# Patient Record
Sex: FEMALE | Race: WHITE | NOT HISPANIC OR LATINO | Employment: FULL TIME | URBAN - METROPOLITAN AREA
[De-identification: names, ages, dates, MRNs, and addresses within clinical notes are randomized per-mention and may not be internally consistent; named-entity substitution may affect disease eponyms.]

---

## 2018-01-11 NOTE — MISCELLANEOUS
Provider Comments  Provider Comments:   PT MISSED APPOINTMENT 11/30/2016 WITH DR Wilmar Laboy      Signatures   Electronically signed by : Rocio Ott, ; Nov 30 2016  2:59PM EST                       (Author)

## 2018-07-10 ENCOUNTER — OFFICE VISIT (OUTPATIENT)
Dept: FAMILY MEDICINE CLINIC | Facility: CLINIC | Age: 58
End: 2018-07-10
Payer: COMMERCIAL

## 2018-07-10 VITALS
DIASTOLIC BLOOD PRESSURE: 86 MMHG | WEIGHT: 178 LBS | SYSTOLIC BLOOD PRESSURE: 140 MMHG | BODY MASS INDEX: 32.76 KG/M2 | TEMPERATURE: 98.9 F | HEIGHT: 62 IN

## 2018-07-10 DIAGNOSIS — F32.A ANXIETY AND DEPRESSION: Primary | ICD-10-CM

## 2018-07-10 DIAGNOSIS — F41.9 ANXIETY AND DEPRESSION: Primary | ICD-10-CM

## 2018-07-10 PROCEDURE — 99203 OFFICE O/P NEW LOW 30 MIN: CPT | Performed by: FAMILY MEDICINE

## 2018-07-10 RX ORDER — CELECOXIB 200 MG/1
1 CAPSULE ORAL DAILY
COMMUNITY
Start: 2016-11-17 | End: 2018-08-08

## 2018-07-10 RX ORDER — CITALOPRAM 10 MG/1
10 TABLET ORAL DAILY
Qty: 30 TABLET | Refills: 0 | Status: SHIPPED | OUTPATIENT
Start: 2018-07-10 | End: 2018-08-08

## 2018-07-10 NOTE — LETTER
July 10, 2018     Patient: Dania Daniel   YOB: 1960   Date of Visit: 7/10/2018       To Whom it May Concern:    Dania Daniel is under my professional care  She was seen in my office on 7/10/2018  She may return to work on 7/23/18  If you have any questions or concerns, please don't hesitate to call           Sincerely,          Peyton Perkins MD        CC: No Recipients

## 2018-07-10 NOTE — PROGRESS NOTES
Assessment/Plan:    62-year-old woman with: Anxiety depression  Discussed workup and treatment options at length with risks and benefits  Encouraged healthy diet like the Mediterranean diet, exercise as adjunct to help her mood, ample sleep and stress reduction  Patient also requests some time off work to begin self-care program   Will refer to psychotherapy  Discussed role for medications in will give script for Celexa 10 milligrams daily and patient plans to think about it  Follow-up visit in 1 month  No problem-specific Assessment & Plan notes found for this encounter  Diagnoses and all orders for this visit:    Anxiety and depression  -     Ambulatory referral to Psychology; Future  -     citalopram (CeleXA) 10 mg tablet; Take 1 tablet (10 mg total) by mouth daily    Other orders  -     celecoxib (CeleBREX) 200 mg capsule; Take 1 capsule by mouth daily          Subjective:   Chief Complaint   Patient presents with    Anxiety          Patient ID: David Ventura is a 56 y o  female  Patient is a 62-year-old woman who presents to establish care in this practice  She admits significant issues with her stress level on her anxiety especially as she has had issues with her children getting involved with the law  She finds it difficult to cope at work and difficult to deal with her situation as home and also having difficulty sleeping  No suicidal or homicidal ideation  Patient is never taken medications for her mood but does admit a strong family history of mental health issues  Anxiety             The following portions of the patient's history were reviewed and updated as appropriate: allergies, current medications, past family history, past medical history, past social history, past surgical history and problem list     Review of Systems   Constitutional: Negative  HENT: Negative  Eyes: Negative  Respiratory: Negative  Cardiovascular: Negative  Gastrointestinal: Negative  Endocrine: Negative  Genitourinary: Negative  Musculoskeletal: Negative  Allergic/Immunologic: Negative  Neurological: Negative  Hematological: Negative  Psychiatric/Behavioral: Negative  All other systems reviewed and are negative  Objective:      /86 (BP Location: Right arm, Patient Position: Sitting, Cuff Size: Standard)   Temp 98 9 °F (37 2 °C) (Tympanic)   Ht 5' 2" (1 575 m)   Wt 80 7 kg (178 lb)   Breastfeeding? No   BMI 32 56 kg/m²          Physical Exam   Constitutional: She is oriented to person, place, and time  She appears well-developed and well-nourished  HENT:   Head: Atraumatic  Right Ear: External ear normal    Left Ear: External ear normal    Eyes: Conjunctivae and EOM are normal  Pupils are equal, round, and reactive to light  Neck: Normal range of motion  Pulmonary/Chest: Effort normal and breath sounds normal  No respiratory distress  Musculoskeletal: Normal range of motion  Neurological: She is alert and oriented to person, place, and time  No cranial nerve deficit  Skin: Skin is warm and dry     Psychiatric: Her behavior is normal  Judgment and thought content normal    Anxious and tearful mood and affect

## 2018-07-20 ENCOUNTER — TELEPHONE (OUTPATIENT)
Dept: FAMILY MEDICINE CLINIC | Facility: CLINIC | Age: 58
End: 2018-07-20

## 2018-07-20 NOTE — LETTER
July 20, 2018     Patient: Cammy Goldsmith   YOB: 1960   Date of Visit: 7/20/2018       To Whom it May Concern:    Cammy Goldsmith is under my professional care  She was seen in my office on 7/20/2018  She may return to work on 7/25/2018  If you have any questions or concerns, please don't hesitate to call           Sincerely,          Amber Lozoya MA        CC: No Recipients

## 2018-07-23 ENCOUNTER — TELEPHONE (OUTPATIENT)
Dept: FAMILY MEDICINE CLINIC | Facility: CLINIC | Age: 58
End: 2018-07-23

## 2018-07-24 NOTE — TELEPHONE ENCOUNTER
I left a message for the patient to return my call 
Note for patient to be out of work Monday and Tuesday and return to work Wednesday    Patient can discuss further with Dr Gabe Gamble when he returns
Note is made and faxed over to employer
Okay with me
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
PT CALLED  SHE GOT THE NOTE TO RETURN TO WORK TOMORROW, Wednesday BUT SHE DOESN'T FEEL LIKE SHE IS READY  SHE WANTS TO KNOW IF YOU COULD EXTEND IT OUT TO RETURN TO WORK 8/6/18? PLEASE FAX TO EMPLOYER AGAIN TO THE NUMBER BELOW  LET PT KNOW WHEN DONE 
1 pair

## 2018-08-08 ENCOUNTER — OFFICE VISIT (OUTPATIENT)
Dept: FAMILY MEDICINE CLINIC | Facility: CLINIC | Age: 58
End: 2018-08-08
Payer: COMMERCIAL

## 2018-08-08 VITALS
WEIGHT: 185 LBS | BODY MASS INDEX: 34.04 KG/M2 | SYSTOLIC BLOOD PRESSURE: 130 MMHG | HEIGHT: 62 IN | DIASTOLIC BLOOD PRESSURE: 90 MMHG

## 2018-08-08 DIAGNOSIS — Z12.11 SCREENING FOR COLON CANCER: ICD-10-CM

## 2018-08-08 DIAGNOSIS — F41.9 ANXIETY AND DEPRESSION: Primary | ICD-10-CM

## 2018-08-08 DIAGNOSIS — F32.A ANXIETY AND DEPRESSION: Primary | ICD-10-CM

## 2018-08-08 PROCEDURE — 99213 OFFICE O/P EST LOW 20 MIN: CPT | Performed by: FAMILY MEDICINE

## 2018-08-08 PROCEDURE — 3008F BODY MASS INDEX DOCD: CPT | Performed by: FAMILY MEDICINE

## 2018-08-09 NOTE — PROGRESS NOTES
Assessment/Plan:    62-year-old woman with: Anxiety depression  Discussed workup and treatment options with risks and benefits and discuss that if she continues to improve that she can follow-up as needed  Of other issues develop she can give us a call  No problem-specific Assessment & Plan notes found for this encounter  Diagnoses and all orders for this visit:    Anxiety and depression    Screening for colon cancer  -     Ambulatory referral to Gastroenterology; Future  -     Occult Bloood,Fecal Immunochemical; Future          Subjective:   Chief Complaint   Patient presents with    Follow-up     1 month follow up on anxiety and depression pt states she is feeling much better  No concerns  Patient ID: Ivone Gutierrez is a 56 y o  female  Patient is a 62-year-old woman who presents for follow-up on anxiety and depression  Patient admits she has been doing self-care with plenty of rest, some time off work and stress reduction and she feels significantly better, over 70%  Patient denies any new side effects and did not end up trying the medicine could she felt that she did not needed  Patient is back to work and feeling good  No other complaints at this point  The following portions of the patient's history were reviewed and updated as appropriate: allergies, current medications, past family history, past medical history, past social history, past surgical history and problem list     Review of Systems   Constitutional: Negative  HENT: Negative  Eyes: Negative  Respiratory: Negative  Cardiovascular: Negative  Gastrointestinal: Negative  Endocrine: Negative  Genitourinary: Negative  Musculoskeletal: Negative  Allergic/Immunologic: Negative  Neurological: Negative  Hematological: Negative  Psychiatric/Behavioral: Negative  All other systems reviewed and are negative          Objective:      /90 (BP Location: Right arm, Patient Position: Sitting, Cuff Size: Standard)   Ht 5' 2" (1 575 m)   Wt 83 9 kg (185 lb)   BMI 33 84 kg/m²          Physical Exam   Constitutional: She is oriented to person, place, and time  She appears well-developed and well-nourished  HENT:   Head: Atraumatic  Right Ear: External ear normal    Left Ear: External ear normal    Eyes: Conjunctivae and EOM are normal  Pupils are equal, round, and reactive to light  Neck: Normal range of motion  Pulmonary/Chest: Effort normal  No respiratory distress  Musculoskeletal: Normal range of motion  Neurological: She is alert and oriented to person, place, and time  No cranial nerve deficit  Skin: Skin is warm and dry  Psychiatric: She has a normal mood and affect   Her behavior is normal  Judgment and thought content normal

## 2019-01-29 DIAGNOSIS — Z12.11 SCREENING FOR COLON CANCER: Primary | ICD-10-CM

## 2020-04-16 ENCOUNTER — TELEPHONE (OUTPATIENT)
Dept: ADMINISTRATIVE | Facility: OTHER | Age: 60
End: 2020-04-16

## 2021-06-25 ENCOUNTER — OFFICE VISIT (OUTPATIENT)
Dept: FAMILY MEDICINE CLINIC | Facility: CLINIC | Age: 61
End: 2021-06-25
Payer: COMMERCIAL

## 2021-06-25 VITALS
SYSTOLIC BLOOD PRESSURE: 140 MMHG | BODY MASS INDEX: 30.2 KG/M2 | HEIGHT: 67 IN | DIASTOLIC BLOOD PRESSURE: 90 MMHG | WEIGHT: 192.4 LBS

## 2021-06-25 DIAGNOSIS — R20.2 NUMBNESS AND TINGLING OF RIGHT THUMB: ICD-10-CM

## 2021-06-25 DIAGNOSIS — Z12.11 SCREENING FOR COLON CANCER: Primary | ICD-10-CM

## 2021-06-25 DIAGNOSIS — S49.91XA INJURY OF RIGHT SHOULDER, INITIAL ENCOUNTER: ICD-10-CM

## 2021-06-25 DIAGNOSIS — R20.0 NUMBNESS AND TINGLING OF RIGHT THUMB: ICD-10-CM

## 2021-06-25 PROCEDURE — 99213 OFFICE O/P EST LOW 20 MIN: CPT | Performed by: FAMILY MEDICINE

## 2021-06-25 PROCEDURE — 3725F SCREEN DEPRESSION PERFORMED: CPT | Performed by: FAMILY MEDICINE

## 2021-06-25 RX ORDER — KETOROLAC TROMETHAMINE 30 MG/ML
30 INJECTION, SOLUTION INTRAMUSCULAR; INTRAVENOUS ONCE
Status: COMPLETED | OUTPATIENT
Start: 2021-06-25 | End: 2021-06-25

## 2021-06-25 RX ORDER — METHOCARBAMOL 500 MG/1
500 TABLET, FILM COATED ORAL 3 TIMES DAILY PRN
Qty: 90 TABLET | Refills: 1 | Status: SHIPPED | OUTPATIENT
Start: 2021-06-25

## 2021-06-25 RX ADMIN — KETOROLAC TROMETHAMINE 30 MG: 30 INJECTION, SOLUTION INTRAMUSCULAR; INTRAVENOUS at 14:38

## 2021-06-25 NOTE — LETTER
June 25, 2021     Patient: Mak Elaine   YOB: 1960   Date of Visit: 6/25/2021       To Whom it May Concern:    Mak Elaine is under my professional care  She was seen in my office on 6/25/2021  She may not return to work until re-evaluated by physician in 2 weeks  If you have any questions or concerns, please don't hesitate to call           Sincerely,          Silver Perdue MD        CC: No Recipients

## 2021-06-28 NOTE — PROGRESS NOTES
Assessment/Plan:    72-year-old woman with:  Right shoulder pain and numbness and tingling in his arms bilaterally discussed heat and cold stretching anti-inflammatories will refer to physical therapy and give muscle relaxer for breakthrough since will give IM Toradol discussed supportive care return parameters advised to call back if not improving or worsen    No problem-specific Assessment & Plan notes found for this encounter  Diagnoses and all orders for this visit:    Screening for colon cancer  -     Ambulatory referral for colonoscopy; Future  -     Ambulatory referral to Physical Therapy; Future  -     methocarbamol (ROBAXIN) 500 mg tablet; Take 1 tablet (500 mg total) by mouth 3 (three) times a day as needed for muscle spasms    Injury of right shoulder, initial encounter  -     ketorolac (TORADOL) 60 mg/2 mL IM injection 30 mg  -     Ambulatory referral to Physical Therapy; Future  -     methocarbamol (ROBAXIN) 500 mg tablet; Take 1 tablet (500 mg total) by mouth 3 (three) times a day as needed for muscle spasms    Numbness and tingling of right thumb  -     Ambulatory referral to Physical Therapy; Future  -     methocarbamol (ROBAXIN) 500 mg tablet; Take 1 tablet (500 mg total) by mouth 3 (three) times a day as needed for muscle spasms          Subjective:     Chief Complaint   Patient presents with    Pain     rt shoulder and arm pain     Physical Exam        Patient ID: Antione Myers is a 64 y o  female  Patient is a 72-year-old woman presents for follow-up on right shoulder pain along with numbness and tingling some and her arms no fevers chills nausea vomiting      The following portions of the patient's history were reviewed and updated as appropriate: allergies, current medications, past family history, past medical history, past social history, past surgical history and problem list     Review of Systems   Constitutional: Negative  HENT: Negative  Eyes: Negative      Respiratory: Negative  Cardiovascular: Negative  Gastrointestinal: Negative  Endocrine: Negative  Genitourinary: Negative  Musculoskeletal: Negative  Allergic/Immunologic: Negative  Neurological: Negative  Hematological: Negative  Psychiatric/Behavioral: Negative  All other systems reviewed and are negative  Objective:      /90 (BP Location: Left arm, Patient Position: Sitting, Cuff Size: Standard)   Ht 5' 7" (1 702 m)   Wt 87 3 kg (192 lb 6 4 oz)   BMI 30 13 kg/m²          Physical Exam  Constitutional:       Appearance: She is well-developed  HENT:      Head: Atraumatic  Right Ear: External ear normal       Left Ear: External ear normal    Eyes:      Conjunctiva/sclera: Conjunctivae normal       Pupils: Pupils are equal, round, and reactive to light  Cardiovascular:      Rate and Rhythm: Normal rate and regular rhythm  Heart sounds: Normal heart sounds  Pulmonary:      Effort: Pulmonary effort is normal  No respiratory distress  Breath sounds: Normal breath sounds  Abdominal:      General: Bowel sounds are normal  There is no distension  Palpations: Abdomen is soft  Tenderness: There is no abdominal tenderness  There is no guarding or rebound  Musculoskeletal:         General: Normal range of motion  Cervical back: Normal range of motion  Skin:     General: Skin is warm and dry  Neurological:      Mental Status: She is alert and oriented to person, place, and time  Cranial Nerves: No cranial nerve deficit  Psychiatric:         Behavior: Behavior normal          Thought Content: Thought content normal          Judgment: Judgment normal          BMI Counseling: Body mass index is 30 13 kg/m²  The BMI is above normal  Nutrition recommendations include encouraging healthy choices of fruits and vegetables  Exercise recommendations include moderate physical activity 150 minutes/week

## 2021-07-09 ENCOUNTER — DOCUMENTATION (OUTPATIENT)
Dept: FAMILY MEDICINE CLINIC | Facility: CLINIC | Age: 61
End: 2021-07-09

## 2021-07-09 ENCOUNTER — OFFICE VISIT (OUTPATIENT)
Dept: FAMILY MEDICINE CLINIC | Facility: CLINIC | Age: 61
End: 2021-07-09
Payer: COMMERCIAL

## 2021-07-09 VITALS
OXYGEN SATURATION: 96 % | BODY MASS INDEX: 35.77 KG/M2 | HEIGHT: 62 IN | WEIGHT: 194.4 LBS | SYSTOLIC BLOOD PRESSURE: 126 MMHG | DIASTOLIC BLOOD PRESSURE: 86 MMHG | TEMPERATURE: 98.4 F | HEART RATE: 97 BPM

## 2021-07-09 DIAGNOSIS — R20.0 NUMBNESS AND TINGLING OF RIGHT THUMB: ICD-10-CM

## 2021-07-09 DIAGNOSIS — Z12.31 ENCOUNTER FOR SCREENING MAMMOGRAM FOR BREAST CANCER: Primary | ICD-10-CM

## 2021-07-09 DIAGNOSIS — M19.90 ARTHRITIS: ICD-10-CM

## 2021-07-09 DIAGNOSIS — R20.2 NUMBNESS AND TINGLING OF RIGHT THUMB: ICD-10-CM

## 2021-07-09 PROCEDURE — 3008F BODY MASS INDEX DOCD: CPT | Performed by: FAMILY MEDICINE

## 2021-07-09 PROCEDURE — 99213 OFFICE O/P EST LOW 20 MIN: CPT | Performed by: FAMILY MEDICINE

## 2021-07-11 NOTE — PROGRESS NOTES
Assessment/Plan:    80-year-old woman with: Neck right shoulder pain and numbness and tingling and right arm  Discussed heat and cold stretching anti-inflammatories refer to PT and advised if not improving worsening to call back    No problem-specific Assessment & Plan notes found for this encounter  Diagnoses and all orders for this visit:    Encounter for screening mammogram for breast cancer  -     Mammo screening bilateral w 3d & cad; Future    Arthritis    Numbness and tingling of right thumb          Subjective:     Chief Complaint   Patient presents with    Follow-up     2 week, discuss medication        Patient ID: Bhavana Demarco is a 64 y o  female  Patient is a 80-year-old woman who presents for follow-up on arthritis and neck shoulder pain and right arm pain with numbness and tingling no fevers chills nausea vomiting      The following portions of the patient's history were reviewed and updated as appropriate: allergies, current medications, past family history, past medical history, past social history, past surgical history and problem list     Review of Systems   Constitutional: Negative  HENT: Negative  Eyes: Negative  Respiratory: Negative  Cardiovascular: Negative  Gastrointestinal: Negative  Endocrine: Negative  Genitourinary: Negative  Musculoskeletal: Positive for arthralgias, myalgias and neck pain  Allergic/Immunologic: Negative  Neurological: Positive for numbness  Hematological: Negative  Psychiatric/Behavioral: Negative  All other systems reviewed and are negative  Objective:      /86   Pulse 97   Temp 98 4 °F (36 9 °C)   Ht 5' 2" (1 575 m)   Wt 88 2 kg (194 lb 6 4 oz)   SpO2 96%   BMI 35 56 kg/m²          Physical Exam  Constitutional:       Appearance: She is well-developed  HENT:      Head: Atraumatic        Right Ear: External ear normal       Left Ear: External ear normal    Eyes:      Conjunctiva/sclera: Conjunctivae normal       Pupils: Pupils are equal, round, and reactive to light  Pulmonary:      Effort: Pulmonary effort is normal  No respiratory distress  Abdominal:      General: There is no distension  Musculoskeletal:         General: Normal range of motion  Cervical back: Normal range of motion  Skin:     General: Skin is warm and dry  Neurological:      Mental Status: She is alert and oriented to person, place, and time  Cranial Nerves: No cranial nerve deficit  Psychiatric:         Behavior: Behavior normal          Thought Content:  Thought content normal          Judgment: Judgment normal

## 2021-07-12 ENCOUNTER — EVALUATION (OUTPATIENT)
Dept: PHYSICAL THERAPY | Facility: CLINIC | Age: 61
End: 2021-07-12
Payer: COMMERCIAL

## 2021-07-12 DIAGNOSIS — Z12.11 SCREENING FOR COLON CANCER: ICD-10-CM

## 2021-07-12 DIAGNOSIS — R20.0 NUMBNESS AND TINGLING OF RIGHT THUMB: ICD-10-CM

## 2021-07-12 DIAGNOSIS — R20.0 NUMBNESS OF RIGHT THUMB: ICD-10-CM

## 2021-07-12 DIAGNOSIS — R20.2 NUMBNESS AND TINGLING OF RIGHT THUMB: ICD-10-CM

## 2021-07-12 DIAGNOSIS — S49.91XA INJURY OF RIGHT SHOULDER, INITIAL ENCOUNTER: Primary | ICD-10-CM

## 2021-07-12 PROCEDURE — 97161 PT EVAL LOW COMPLEX 20 MIN: CPT | Performed by: PHYSICAL THERAPIST

## 2021-07-12 PROCEDURE — 97110 THERAPEUTIC EXERCISES: CPT | Performed by: PHYSICAL THERAPIST

## 2021-07-12 NOTE — PROGRESS NOTES
PT Evaluation     Today's date: 2021  Patient name: Adelfo Bella  : 1960  MRN: [id number]  Referring provider: Wanda Strickland DO  Dx:   Encounter Diagnosis     ICD-10-CM    1  Injury of right shoulder, initial encounter  S49  91XA Ambulatory referral to Physical Therapy   2  Numbness of right thumb  R20 0    3  Screening for colon cancer  Z12 11 Ambulatory referral to Physical Therapy   4  Numbness and tingling of right thumb  R20 0 Ambulatory referral to Physical Therapy    R20 2        Start Time: 808          Assessment  Assessment details: Adelfo Bella is a pleasant 64 y o  female who presents with signs and symptoms correlating with referring diagnosis  No further referral appears necessary at this time based upon examination results  The patient's greatest concerns are decreasing pain to return to work  She presents with a movement impairment diagnosis of hypomobile cervical extension ROM  She demonstrates an extension based derangement of her cervical spine with centralization during retractions to her arm  This also presents with ER shoulder and extension of thumb strength deficits, hyposensitivity to the R arm in C6 pattern, and poor ipsilateral rotation and lateral flexion of cervical spine  Negative prognostic indicators: none  Positive prognostic indicators: good motivation  Please contact me if you have any further questions or recommendations  Thank you very much for the kind referral       Impairments: abnormal or restricted ROM, abnormal movement, activity intolerance, impaired physical strength and pain with function    Symptom irritability: moderateUnderstanding of Dx/Px/POC: good   Prognosis: good    Goals  STGs  1  Decrease pain by 20% in 2-4 weeks  2  Improve cervical ROM by 25% in 2-4 weeks  3  Improve shoulder ER and thumb ext strength by 1/3 grade in 2-4 weeks  4  Let arm hang at side without pain in 4 weeks  5  Complete HEP with (I) in 4 weeks  LTGs  1   Decrease pain by 60% in 6-8 weeks  2  Improve lifting tolerance to >20 #s in 6-8 weeks  3  Perform job activities without pain in 6-8 weeks  Plan  Plan details: 1-2x/week depending on need  Patient would benefit from: skilled physical therapy  Referral necessary: No  Planned modality interventions: cryotherapy, TENS and thermotherapy: hydrocollator packs  Planned therapy interventions: manual therapy, therapeutic training, stretching, strengthening, therapeutic activities, therapeutic exercise, patient education, activity modification, neuromuscular re-education and home exercise program  Frequency: 2x week  Duration in weeks: 6  Treatment plan discussed with: patient        Subjective Evaluation    History of Present Illness  Mechanism of injury: Pt is a 64 y o  female presenting w/ radicular cervical neck pain, starting 3 weeks ago  She states this started insidiously and presents in a radicular pattern from her scapular region to the R hand  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, and lift furniture  However, states she can tolerate moving the arm above head and finds comfort with this, although, returning arm to neutral unsupported increases pain  She is stating the pain has been improving on its own  Pain is worse when arm is hanging at her side, states pain epicenter is on the R side of CTJ       Neurological signs: numbness of R palm thumb in radicular pattern   Red Flags: none   PMH: none           Not a recurrent problem   Quality of life: good    Pain  Current pain ratin  At best pain ratin  At worst pain ratin  Location: R rhomboid and paraspinals   Quality: dull ache  Relieving factors: change in position  Exacerbated by: driving a car   Progression: improved    Social Support  Lives with: spouse    Employment status: working  Hand dominance: right    Patient Goals  Patient goals for therapy: increased strength, decreased pain, increased motion, independence with ADLs/IADLs and return to work          Objective     Neurological Testing     Sensation   Cervical/Thoracic   Left   Intact: light touch    Right   Hyposensation: light touch    Comments   Right light touch: C6 dermatome       Active Range of Motion   Cervical/Thoracic Spine       Cervical  Subcranial protraction:  WFL   Subcranial retraction:  with pain   Restriction level: maximal  Flexion:  WFL  Extension:  with pain Restriction level: maximal  Left lateral flexion:  WFL  Right lateral flexion:  with pain Restriction level minimal  Left rotation:  WFL  Right rotation:  with pain Restriction level: minimal  Left Shoulder   Normal active range of motion  Internal rotation BTB: T9     Right Shoulder   Normal active range of motion  Internal rotation BTB: sacrum   Mechanical Assessment    Cervical    Seated retraction: repeated movements   Pain location: no change  Pain intensity: better  Pain level: decreased  unable to achieve end range, in supine or sitting    Thoracic      Lumbar      Strength/Myotome Testing     Left Shoulder     Planes of Motion   Flexion: 4+   Extension: 4+   Abduction: 4+   Adduction: 4+   External rotation at 0°: 3+   Internal rotation at 0°: 4+     Isolated Muscles   Biceps: 4+   Triceps: 4+     Right Shoulder     Planes of Motion   Flexion: 4+   Extension: 4+   Abduction: 4+   Adduction: 4+   External rotation at 0°: 4+   Internal rotation at 0°: 4+     Isolated Muscles   Biceps: 4+   Triceps: 4+     Additional Strength Details  B wrist WNL, thumb ext 4-/5 LUE and 3/5 RUE                Precautions: none    Daily Treatment Diary    Date 7/12       Visit Number 1       FOTO IE       Re-Eval IE          Manuals    Retraction with OP         Cervical retraction,distraciton, extension                        Neuro Re-Ed     xochitl row         xochitl ext         Radial nerve glides        No monies                                 Ther Ex    Cervical retraction in sitting  HEP       Retraction in sitting with OP  HEP        Retraction with ext in sitting         UBE BW         Scapular slides         Thoracic ext with OP for upper thoracic                         Ther Activity                                    Modalities    MHP PRN

## 2021-07-16 ENCOUNTER — OFFICE VISIT (OUTPATIENT)
Dept: PHYSICAL THERAPY | Facility: CLINIC | Age: 61
End: 2021-07-16
Payer: COMMERCIAL

## 2021-07-16 DIAGNOSIS — Z12.11 SCREENING FOR COLON CANCER: ICD-10-CM

## 2021-07-16 DIAGNOSIS — R20.2 NUMBNESS AND TINGLING OF RIGHT THUMB: ICD-10-CM

## 2021-07-16 DIAGNOSIS — R20.0 NUMBNESS OF RIGHT THUMB: ICD-10-CM

## 2021-07-16 DIAGNOSIS — S49.91XA INJURY OF RIGHT SHOULDER, INITIAL ENCOUNTER: Primary | ICD-10-CM

## 2021-07-16 DIAGNOSIS — R20.0 NUMBNESS AND TINGLING OF RIGHT THUMB: ICD-10-CM

## 2021-07-16 PROCEDURE — 97112 NEUROMUSCULAR REEDUCATION: CPT | Performed by: PHYSICAL THERAPIST

## 2021-07-16 PROCEDURE — 97110 THERAPEUTIC EXERCISES: CPT | Performed by: PHYSICAL THERAPIST

## 2021-07-16 NOTE — PROGRESS NOTES
Daily Note     Today's date: 2021  Patient name: Stevie Ruiz  : 1960  MRN: [id number]  Referring provider: Masoud Hodgson DO  Dx:   Encounter Diagnosis     ICD-10-CM    1  Injury of right shoulder, initial encounter  S49  91XA    2  Numbness of right thumb  R20 0    3  Screening for colon cancer  Z12 11    4  Numbness and tingling of right thumb  R20 0     R20 2                   Subjective: Pt reports that she has noticed slight improvement in overall tolerance to ROM and decreased numbness in the finger in the AM when she wakes up  She is currently doing well with interventions, but is still progressing  Objective: See treatment diary below      Assessment: Tolerated treatment well  Patient did have increased discomfort with some interventions today, but was able to complete all of them without issues  She will continue to progress next visit as tolerated, and was educated about her current condition and that is easily modified with interventions and repetitive loading  Plan: Continue per plan of care        Precautions: none    Daily Treatment Diary    Date       Visit Number 1 2      FOTO IE       Re-Eval IE          Manuals    Retraction with OP         Cervical retraction,distraciton, extension                        Neuro Re-Ed     xochitl row   14 5R 2x15      xochitl ext   11R 2x15      Radial nerve glides  10x w/ cues      No monies   10x5" GTB       Wall walks         Body blade        Ball on wall         Ther Ex    Cervical retraction in sitting  HEP 10x      Retraction in sitting with OP  HEP  10x       Retraction with ext in sitting   hold      UBE BW   5'      Scapular slides   10x2"        Thoracic ext with OP for upper thoracic   10x       Prone y/t                 Ther Activity                                    Modalities    MHP PRN

## 2021-07-20 ENCOUNTER — OFFICE VISIT (OUTPATIENT)
Dept: PHYSICAL THERAPY | Facility: CLINIC | Age: 61
End: 2021-07-20
Payer: COMMERCIAL

## 2021-07-20 DIAGNOSIS — S49.91XA INJURY OF RIGHT SHOULDER, INITIAL ENCOUNTER: Primary | ICD-10-CM

## 2021-07-20 DIAGNOSIS — R20.0 NUMBNESS OF RIGHT THUMB: ICD-10-CM

## 2021-07-20 PROCEDURE — 97110 THERAPEUTIC EXERCISES: CPT

## 2021-07-20 PROCEDURE — 97112 NEUROMUSCULAR REEDUCATION: CPT

## 2021-07-20 NOTE — PROGRESS NOTES
Daily Note     Today's date: 2021  Patient name: Rikki Cardenas  : 1960  MRN: [id number]  Referring provider: Brenna Nuñez DO  Dx:   Encounter Diagnosis     ICD-10-CM    1  Injury of right shoulder, initial encounter  S49  91XA    2  Numbness of right thumb  R20 0                   Subjective: Pt presents to PT reporting mild decrease in sx radiating down R UE  Pt denies increased pain or sx post PT session  Pt reports increased sx with cervical retractions but states the sx disipate quickly  Objective: See treatment diary below      Assessment: Tolerated treatment well but challenged with B shoulder over head secondary to fatigue  Attempted STM to R UT however pt reporting burning with STM  Performed SOR and gentle distraction with + response  Patient demonstrated fatigue post treatment, exhibited good technique with therapeutic exercises and would benefit from continued PT to increase flexibility, strength and function  Plan: Continue per plan of care        Precautions: none    Daily Treatment Diary    Date      Visit Number 1 2 3     FOTO IE       Re-Eval IE          Manuals    Retraction with OP         Cervical retraction,distraciton, extension        STM   PK     SOR & distraction   PK     Neuro Re-Ed     xochitl row   14 5R 2x15 15R 2x15     xochitl ext   11R 2x15 11 5R 2x15     Radial nerve glides  10x w/ cues 10x w/ cues     No monies   10x5" GTB  10x5" GTB     Wall walks         Body blade        Ball on wall         Ther Ex    Cervical retraction in sitting  HEP 10x 10x     Retraction in sitting with OP  HEP  10x  10x     Retraction with ext in sitting   hold      UBE BW   5' 5'     Scapular slides   10x2"   5" x 10     Thoracic ext with OP for upper thoracic   10x  10x     Prone y/t                 Ther Activity                                    Modalities    MHP PRN

## 2021-07-27 ENCOUNTER — OFFICE VISIT (OUTPATIENT)
Dept: PHYSICAL THERAPY | Facility: CLINIC | Age: 61
End: 2021-07-27
Payer: COMMERCIAL

## 2021-07-27 DIAGNOSIS — R20.0 NUMBNESS OF RIGHT THUMB: ICD-10-CM

## 2021-07-27 DIAGNOSIS — S49.91XA INJURY OF RIGHT SHOULDER, INITIAL ENCOUNTER: Primary | ICD-10-CM

## 2021-07-27 PROCEDURE — 97140 MANUAL THERAPY 1/> REGIONS: CPT

## 2021-07-27 PROCEDURE — 97112 NEUROMUSCULAR REEDUCATION: CPT

## 2021-07-27 PROCEDURE — 97535 SELF CARE MNGMENT TRAINING: CPT

## 2021-07-27 PROCEDURE — 97110 THERAPEUTIC EXERCISES: CPT

## 2021-07-27 NOTE — PROGRESS NOTES
Daily Note     Today's date: 2021  Patient name: Joan Da Silva  : 1960  MRN: [id number]  Referring provider: Neena Ramey DO  Dx:   Encounter Diagnosis     ICD-10-CM    1  Injury of right shoulder, initial encounter  S49  91XA    2  Numbness of right thumb  R20 0                   Subjective: Pt presents to PT reporting temporary relief in R UT after last session but states only lasted 2 days  She reports R UE sx increase with activity such as carrying grocery items  Objective: See treatment diary below      Assessment: Pt demonstrates fair tolerance to manual therapy pt noting increased sx of achy R UE   STM Dc'd secondary to irritation noted in UT and posterior R shoulder  Issued pt an updated HEP with green and blue band  Instructed pt on proper use of bands and reviewed TE  Patient demonstrated fatigue post treatment, exhibited good technique with therapeutic exercises and would benefit from continued PT to increase flexibility, strength and function  Plan: Continue per plan of care        Precautions: none    Daily Treatment Diary    Date     Visit Number 1 2 3 4    FOTO IE       Re-Eval IE          Manuals    Retraction with OP         Cervical retraction,distraciton, extension    HY    STM   PK PK    SOR & distraction   PK PK    Neuro Re-Ed     xochitl row   14 5R 2x15 15R 2x15 15R 2x15    xochitl ext   11R 2x15 11 5R 2x15 12R 2x15    Radial nerve glides  10x w/ cues 10x w/ cues 10x w/ cues    No monies   10x5" GTB  10x5" GTB 10x5" GTB    Wall walks         Body blade        Ball on wall         Ther Ex    Cervical retraction in sitting  HEP 10x 10x 10x    Retraction in sitting with OP  HEP  10x  10x 10x    Retraction with ext in sitting   hold      UBE BW   5' 5' 5'    Scapular slides   10x2"   5" x 10 5" x 10    Thoracic ext with OP for upper thoracic   10x  10x 10x    Prone y/t                 Ther Activity                                    Modalities MHP PRN

## 2021-07-30 ENCOUNTER — RA CDI HCC (OUTPATIENT)
Dept: OTHER | Facility: HOSPITAL | Age: 61
End: 2021-07-30

## 2021-07-30 NOTE — PROGRESS NOTES
[facility] 77  coding opportunities          Chart reviewed, no opportunity found: CHART REVIEWED, NO OPPORTUNITY FOUND                     Patients insurance company: [employer] (Medicare Advantage and Commercial)

## 2021-08-02 ENCOUNTER — APPOINTMENT (OUTPATIENT)
Dept: PHYSICAL THERAPY | Facility: CLINIC | Age: 61
End: 2021-08-02
Payer: COMMERCIAL

## 2021-08-04 ENCOUNTER — OFFICE VISIT (OUTPATIENT)
Dept: PHYSICAL THERAPY | Facility: CLINIC | Age: 61
End: 2021-08-04
Payer: COMMERCIAL

## 2021-08-04 DIAGNOSIS — R20.0 NUMBNESS OF RIGHT THUMB: ICD-10-CM

## 2021-08-04 DIAGNOSIS — S49.91XA INJURY OF RIGHT SHOULDER, INITIAL ENCOUNTER: Primary | ICD-10-CM

## 2021-08-04 DIAGNOSIS — R20.2 NUMBNESS AND TINGLING OF RIGHT THUMB: ICD-10-CM

## 2021-08-04 DIAGNOSIS — R20.0 NUMBNESS AND TINGLING OF RIGHT THUMB: ICD-10-CM

## 2021-08-04 PROCEDURE — 97140 MANUAL THERAPY 1/> REGIONS: CPT

## 2021-08-04 PROCEDURE — 97110 THERAPEUTIC EXERCISES: CPT

## 2021-08-04 PROCEDURE — 97112 NEUROMUSCULAR REEDUCATION: CPT

## 2021-08-04 NOTE — PROGRESS NOTES
Daily Note     Today's date: 2021  Patient name: Bhavana Demarco  : 1960  MRN: [id number]  Referring provider: Jacqueline Johnson DO  Dx:   Encounter Diagnosis     ICD-10-CM    1  Injury of right shoulder, initial encounter  S49  91XA    2  Numbness of right thumb  R20 0    3  Numbness and tingling of right thumb  R20 0     R20 2                   Subjective: Pt presents to PT reporting tightness in posterior R shoulder remains but states R UE is feeling better noting less numbness and tingling  Objective: See treatment diary below      Assessment: Pt demonstrates increased sx down R UE with STM along levator from low CS to posterior shoulder an unable to tolerate deep STM  Added side glide as noted above with positive response  Pt advised to perform stretches to increased flexibility in R UT  Patient demonstrated fatigue post treatment, exhibited good technique with therapeutic exercises and would benefit from continued PT to decrease sx in UE and sensitivity in posterior shoulder  Plan: Continue per plan of care        Precautions: none    Daily Treatment Diary    Date  8/   Visit Number 1 2 3 4 5   FOTO IE    50/57   Re-Eval IE          Manuals    Retraction with OP         Cervical retraction,distraciton, extension    HY HY, also side glide to L c median nerve glide R UE   STM   PK PK PK   SOR & distraction   PK PK    Neuro Re-Ed     xochitl row   14 5R 2x15 15R 2x15 15R 2x15 15 5R 2x10   xochitl ext   11R 2x15 11 5R 2x15 12R 2x15 12 5R 2x10   Radial nerve glides  10x w/ cues 10x w/ cues 10x w/ cues 10x w/ cues   No monies   10x5" GTB  10x5" GTB 10x5" GTB 10x5" GTB   Wall walks         Body blade        Ball on wall         Ther Ex    Cervical retraction in sitting  HEP 10x 10x 10x 10x   Retraction in sitting with OP  HEP  10x  10x 10x 10x   Retraction with ext in sitting   hold      UBE BW   5' 5' 5' nv   Scapular slides   10x2"   5" x 10 5" x 10 5" x 10   Thoracic ext with OP for upper thoracic   10x  10x 10x 10x   Prone y/t      10x   UT & Lev to R only     20" x 5   Ther Activity                                    Modalities    MHP PRN

## 2021-08-06 ENCOUNTER — OFFICE VISIT (OUTPATIENT)
Dept: FAMILY MEDICINE CLINIC | Facility: CLINIC | Age: 61
End: 2021-08-06
Payer: COMMERCIAL

## 2021-08-06 VITALS
WEIGHT: 194 LBS | HEIGHT: 62 IN | DIASTOLIC BLOOD PRESSURE: 80 MMHG | BODY MASS INDEX: 35.7 KG/M2 | SYSTOLIC BLOOD PRESSURE: 120 MMHG

## 2021-08-06 DIAGNOSIS — Z00.00 ROUTINE ADULT HEALTH MAINTENANCE: Primary | ICD-10-CM

## 2021-08-06 DIAGNOSIS — Z72.0 TOBACCO ABUSE: ICD-10-CM

## 2021-08-06 PROCEDURE — 3008F BODY MASS INDEX DOCD: CPT | Performed by: FAMILY MEDICINE

## 2021-08-06 PROCEDURE — 99213 OFFICE O/P EST LOW 20 MIN: CPT | Performed by: FAMILY MEDICINE

## 2021-08-06 PROCEDURE — 1036F TOBACCO NON-USER: CPT | Performed by: FAMILY MEDICINE

## 2021-08-06 PROCEDURE — 99396 PREV VISIT EST AGE 40-64: CPT | Performed by: FAMILY MEDICINE

## 2021-08-06 RX ORDER — NICOTINE 21 MG/24HR
1 PATCH, TRANSDERMAL 24 HOURS TRANSDERMAL EVERY 24 HOURS
Qty: 28 PATCH | Refills: 0 | Status: SHIPPED | OUTPATIENT
Start: 2021-08-06

## 2021-08-06 NOTE — LETTER
August 6, 2021     Patient: Dania Daniel   YOB: 1960   Date of Visit: 8/6/2021       To Whom it May Concern:    Dania Daniel is under my professional care  She was seen in my office on 8/6/2021  She may not return to work until re-evaluated by physician in 4 weeks  If you have any questions or concerns, please don't hesitate to call           Sincerely,          Peyton Perkins MD        CC: No Recipients

## 2021-08-09 NOTE — PROGRESS NOTES
Assessment/Plan:    68-year-old woman with: Annual well visit is a safety and health maintenance issues including healthy diet Mediterranean diet exercise healthy weight as tolerated ample sleep stress reduction strategies will check labs    No problem-specific Assessment & Plan notes found for this encounter  Diagnoses and all orders for this visit:    Routine adult health maintenance  -     CBC and differential; Future  -     Comprehensive metabolic panel; Future  -     TSH, 3rd generation with Free T4 reflex; Future  -     Lipid Panel with Direct LDL reflex; Future    Tobacco abuse  -     nicotine (NICODERM CQ) 14 mg/24hr TD 24 hr patch; Place 1 patch on the skin every 24 hours          Subjective:     Chief Complaint   Patient presents with    Annual Exam    Follow-up     rt arm        Patient ID: Mimi Garcia is a 64 y o  female  PATIENT IS A 64-YEAR-OLD WOMAN WHO PRESENTS FOR AN ANNUAL WELL VISIT SHE ADMITS SHE IS PHYSICALLY ACTIVE GENERALLY EATS HEALTHY DIET SHE SLEEPS WELL NO OTHER HEALTH MAINTENANCE ISSUES AT THIS TIME      The following portions of the patient's history were reviewed and updated as appropriate: allergies, current medications, past family history, past medical history, past social history, past surgical history and problem list     Review of Systems   Constitutional: Negative  HENT: Negative  Eyes: Negative  Respiratory: Negative  Cardiovascular: Negative  Gastrointestinal: Negative  Endocrine: Negative  Genitourinary: Negative  Musculoskeletal: Negative  Allergic/Immunologic: Negative  Neurological: Negative  Hematological: Negative  Psychiatric/Behavioral: Negative  All other systems reviewed and are negative          Objective:      /80 (BP Location: Left arm, Patient Position: Sitting, Cuff Size: Large)   Ht 5' 2" (1 575 m)   Wt 88 kg (194 lb)   BMI 35 48 kg/m²          Physical Exam  Constitutional:       Appearance: She is well-developed  HENT:      Head: Atraumatic  Right Ear: External ear normal       Left Ear: External ear normal    Eyes:      Conjunctiva/sclera: Conjunctivae normal       Pupils: Pupils are equal, round, and reactive to light  Cardiovascular:      Rate and Rhythm: Normal rate and regular rhythm  Heart sounds: Normal heart sounds  Pulmonary:      Effort: Pulmonary effort is normal  No respiratory distress  Breath sounds: Normal breath sounds  Abdominal:      General: Bowel sounds are normal  There is no distension  Palpations: Abdomen is soft  Tenderness: There is no abdominal tenderness  There is no guarding or rebound  Musculoskeletal:         General: Normal range of motion  Cervical back: Normal range of motion  Skin:     General: Skin is warm and dry  Neurological:      Mental Status: She is alert and oriented to person, place, and time  Cranial Nerves: No cranial nerve deficit  Psychiatric:         Behavior: Behavior normal          Thought Content:  Thought content normal          Judgment: Judgment normal

## 2021-08-09 NOTE — PROGRESS NOTES
Assessment/Plan:    53-year-old woman with: Tobacco abuse discussed smoking cessation strategies will give script for nicotine replacement discussed supportive care return parameters    No problem-specific Assessment & Plan notes found for this encounter  Diagnoses and all orders for this visit:    Routine adult health maintenance  -     CBC and differential; Future  -     Comprehensive metabolic panel; Future  -     TSH, 3rd generation with Free T4 reflex; Future  -     Lipid Panel with Direct LDL reflex; Future    Tobacco abuse  -     nicotine (NICODERM CQ) 14 mg/24hr TD 24 hr patch; Place 1 patch on the skin every 24 hours          Subjective:     Chief Complaint   Patient presents with    Annual Exam    Follow-up     rt arm        Patient ID: Rajendra Mclean is a 64 y o  female  Patient is a 53-year-old male presents requesting help quitting smoking no fevers chills nausea vomiting      The following portions of the patient's history were reviewed and updated as appropriate: allergies, current medications, past family history, past medical history, past social history, past surgical history and problem list     Review of Systems   Constitutional: Negative  HENT: Negative  Eyes: Negative  Respiratory: Negative  Cardiovascular: Negative  Gastrointestinal: Negative  Endocrine: Negative  Genitourinary: Negative  Musculoskeletal: Negative  Allergic/Immunologic: Negative  Neurological: Negative  Hematological: Negative  Psychiatric/Behavioral: Negative  All other systems reviewed and are negative  Objective:      /80 (BP Location: Left arm, Patient Position: Sitting, Cuff Size: Large)   Ht 5' 2" (1 575 m)   Wt 88 kg (194 lb)   BMI 35 48 kg/m²          Physical Exam  Constitutional:       Appearance: She is well-developed  HENT:      Head: Atraumatic        Right Ear: External ear normal       Left Ear: External ear normal    Eyes:      Conjunctiva/sclera: Conjunctivae normal       Pupils: Pupils are equal, round, and reactive to light  Cardiovascular:      Rate and Rhythm: Normal rate and regular rhythm  Heart sounds: Normal heart sounds  Pulmonary:      Effort: Pulmonary effort is normal  No respiratory distress  Breath sounds: Normal breath sounds  Abdominal:      General: Bowel sounds are normal  There is no distension  Palpations: Abdomen is soft  Tenderness: There is no abdominal tenderness  There is no guarding or rebound  Musculoskeletal:         General: Normal range of motion  Cervical back: Normal range of motion  Skin:     General: Skin is warm and dry  Neurological:      Mental Status: She is alert and oriented to person, place, and time  Cranial Nerves: No cranial nerve deficit  Psychiatric:         Behavior: Behavior normal          Thought Content: Thought content normal          Judgment: Judgment normal          BMI Counseling: Body mass index is 35 48 kg/m²  The BMI is above normal  Nutrition recommendations include encouraging healthy choices of fruits and vegetables  Exercise recommendations include moderate physical activity 150 minutes/week

## 2021-08-13 ENCOUNTER — OFFICE VISIT (OUTPATIENT)
Dept: PHYSICAL THERAPY | Facility: CLINIC | Age: 61
End: 2021-08-13
Payer: COMMERCIAL

## 2021-08-13 DIAGNOSIS — R20.0 NUMBNESS AND TINGLING OF RIGHT THUMB: ICD-10-CM

## 2021-08-13 DIAGNOSIS — R20.0 NUMBNESS OF RIGHT THUMB: ICD-10-CM

## 2021-08-13 DIAGNOSIS — R20.2 NUMBNESS AND TINGLING OF RIGHT THUMB: ICD-10-CM

## 2021-08-13 DIAGNOSIS — S49.91XA INJURY OF RIGHT SHOULDER, INITIAL ENCOUNTER: Primary | ICD-10-CM

## 2021-08-13 PROCEDURE — 97140 MANUAL THERAPY 1/> REGIONS: CPT | Performed by: PHYSICAL THERAPIST

## 2021-08-13 PROCEDURE — 97112 NEUROMUSCULAR REEDUCATION: CPT | Performed by: PHYSICAL THERAPIST

## 2021-08-13 PROCEDURE — 97110 THERAPEUTIC EXERCISES: CPT | Performed by: PHYSICAL THERAPIST

## 2021-08-13 NOTE — PROGRESS NOTES
Daily Note     Today's date: 2021  Patient name: Karis Mina  : 1960  MRN: [id number]  Referring provider: Avinash Snyder DO  Dx:   Encounter Diagnosis     ICD-10-CM    1  Injury of right shoulder, initial encounter  S49  91XA    2  Numbness of right thumb  R20 0    3  Numbness and tingling of right thumb  R20 0     R20 2                   Subjective: Pt reports that she is doing a little better since last time and has felt improvement post the manual techniques  However, she is still experiencing numbness in her thumb and proximal extremity  Objective: See treatment diary below      Assessment: throughout the session she was taken through a mechanical assessment  Consistnetly she was finding relief with extension in supine, however, once she returned to neutral position  The pain returned into her shoulder  She was educated that she requires to continue to complete these interventions throughout the day to improve ROM  Plan: Continue per plan of care        Precautions: none    Daily Treatment Diary    Date  8   Visit Number 6  3 4 5   FOTO     50/57   Re-Eval           Manuals    Retraction with OP  [address]       Cervical retraction,distraciton, extension MK w/ SP L median nerve glide    HY HY, also side glide to L c median nerve glide R UE   STM   PK PK PK   SOR & distraction   PK PK    Neuro Re-Ed     xochitl row  13R 2x10   15R 2x15 15R 2x15 15 5R 2x10   xochitl ext  13R 2x10  11 5R 2x15 12R 2x15 12 5R 2x10   Radial nerve glides   10x w/ cues 10x w/ cues 10x w/ cues   No monies    10x5" GTB 10x5" GTB 10x5" GTB   Wall walks  GTB 7x       Body blade        Ball on wall         Ther Ex    Cervical retraction in sitting  10x  hold 10x 10x 10x   Retraction in sitting with OP  10x hold 10x 10x 10x   Retraction in supine 10x       Retraction extension supine  10x        UT stretch B  20"x5        Retraction with ext in sitting  Unable        UBE BW  5'  5' 5' nv   Scapular slides 5" x 10 5" x 10 5" x 10   Thoracic ext with OP for upper thoracic    10x 10x 10x   Prone y/t      10x   UT & Lev to R only     20" x 5   Ther Activity                                    Modalities    MHP PRN

## 2021-08-26 ENCOUNTER — OFFICE VISIT (OUTPATIENT)
Dept: PHYSICAL THERAPY | Facility: CLINIC | Age: 61
End: 2021-08-26
Payer: COMMERCIAL

## 2021-08-26 DIAGNOSIS — R20.0 NUMBNESS AND TINGLING OF RIGHT THUMB: ICD-10-CM

## 2021-08-26 DIAGNOSIS — S49.91XA INJURY OF RIGHT SHOULDER, INITIAL ENCOUNTER: Primary | ICD-10-CM

## 2021-08-26 DIAGNOSIS — R20.2 NUMBNESS AND TINGLING OF RIGHT THUMB: ICD-10-CM

## 2021-08-26 DIAGNOSIS — R20.0 NUMBNESS OF RIGHT THUMB: ICD-10-CM

## 2021-08-26 PROCEDURE — 97112 NEUROMUSCULAR REEDUCATION: CPT

## 2021-08-26 PROCEDURE — 97110 THERAPEUTIC EXERCISES: CPT

## 2021-08-26 PROCEDURE — 97140 MANUAL THERAPY 1/> REGIONS: CPT

## 2021-08-31 ENCOUNTER — LAB (OUTPATIENT)
Dept: LAB | Age: 61
End: 2021-08-31
Payer: COMMERCIAL

## 2021-08-31 DIAGNOSIS — Z00.00 ROUTINE ADULT HEALTH MAINTENANCE: ICD-10-CM

## 2021-08-31 LAB
ALBUMIN SERPL BCP-MCNC: 3.7 G/DL (ref 3.5–5)
ALP SERPL-CCNC: 101 U/L (ref 46–116)
ALT SERPL W P-5'-P-CCNC: 41 U/L (ref 12–78)
ANION GAP SERPL CALCULATED.3IONS-SCNC: 5 MMOL/L (ref 4–13)
AST SERPL W P-5'-P-CCNC: 22 U/L (ref 5–45)
BASOPHILS # BLD AUTO: 0.1 THOUSANDS/ΜL (ref 0–0.1)
BASOPHILS NFR BLD AUTO: 1 % (ref 0–1)
BILIRUB SERPL-MCNC: 0.57 MG/DL (ref 0.2–1)
BUN SERPL-MCNC: 13 MG/DL (ref 5–25)
CALCIUM SERPL-MCNC: 9.3 MG/DL (ref 8.3–10.1)
CHLORIDE SERPL-SCNC: 107 MMOL/L (ref 100–108)
CHOLEST SERPL-MCNC: 215 MG/DL (ref 50–200)
CO2 SERPL-SCNC: 27 MMOL/L (ref 21–32)
CREAT SERPL-MCNC: 0.73 MG/DL (ref 0.6–1.3)
EOSINOPHIL # BLD AUTO: 0.25 THOUSAND/ΜL (ref 0–0.61)
EOSINOPHIL NFR BLD AUTO: 3 % (ref 0–6)
ERYTHROCYTE [DISTWIDTH] IN BLOOD BY AUTOMATED COUNT: 13 % (ref 11.6–15.1)
GFR SERPL CREATININE-BSD FRML MDRD: 91 ML/MIN/1.73SQ M
GLUCOSE P FAST SERPL-MCNC: 105 MG/DL (ref 65–99)
HCT VFR BLD AUTO: 44.8 % (ref 34.8–46.1)
HDLC SERPL-MCNC: 58 MG/DL
HGB BLD-MCNC: 14.5 G/DL (ref 11.5–15.4)
IMM GRANULOCYTES # BLD AUTO: 0.03 THOUSAND/UL (ref 0–0.2)
IMM GRANULOCYTES NFR BLD AUTO: 0 % (ref 0–2)
LDLC SERPL CALC-MCNC: 140 MG/DL (ref 0–100)
LYMPHOCYTES # BLD AUTO: 2.35 THOUSANDS/ΜL (ref 0.6–4.47)
LYMPHOCYTES NFR BLD AUTO: 25 % (ref 14–44)
MCH RBC QN AUTO: 32 PG (ref 26.8–34.3)
MCHC RBC AUTO-ENTMCNC: 32.4 G/DL (ref 31.4–37.4)
MCV RBC AUTO: 99 FL (ref 82–98)
MONOCYTES # BLD AUTO: 0.68 THOUSAND/ΜL (ref 0.17–1.22)
MONOCYTES NFR BLD AUTO: 7 % (ref 4–12)
NEUTROPHILS # BLD AUTO: 6.06 THOUSANDS/ΜL (ref 1.85–7.62)
NEUTS SEG NFR BLD AUTO: 64 % (ref 43–75)
PLATELET # BLD AUTO: 234 THOUSANDS/UL (ref 149–390)
POTASSIUM SERPL-SCNC: 4.2 MMOL/L (ref 3.5–5.3)
PROT SERPL-MCNC: 7.8 G/DL (ref 6.4–8.2)
RBC # BLD AUTO: 4.53 MILLION/UL (ref 3.81–5.12)
SODIUM SERPL-SCNC: 139 MMOL/L (ref 136–145)
TRIGL SERPL-MCNC: 86 MG/DL
TSH SERPL DL<=0.05 MIU/L-ACNC: 1.46 UIU/ML (ref 0.36–3.74)
WBC # BLD AUTO: 9.47 THOUSAND/UL (ref 4.31–10.16)

## 2021-08-31 PROCEDURE — 85025 COMPLETE CBC W/AUTO DIFF WBC: CPT

## 2021-08-31 PROCEDURE — 36415 COLL VENOUS BLD VENIPUNCTURE: CPT

## 2021-08-31 PROCEDURE — 80061 LIPID PANEL: CPT

## 2021-08-31 PROCEDURE — 84443 ASSAY THYROID STIM HORMONE: CPT

## 2021-08-31 PROCEDURE — 80053 COMPREHEN METABOLIC PANEL: CPT

## 2021-09-01 ENCOUNTER — OFFICE VISIT (OUTPATIENT)
Dept: PHYSICAL THERAPY | Facility: CLINIC | Age: 61
End: 2021-09-01
Payer: COMMERCIAL

## 2021-09-01 DIAGNOSIS — S49.91XA INJURY OF RIGHT SHOULDER, INITIAL ENCOUNTER: Primary | ICD-10-CM

## 2021-09-01 DIAGNOSIS — R20.0 NUMBNESS OF RIGHT THUMB: ICD-10-CM

## 2021-09-01 DIAGNOSIS — R20.2 NUMBNESS AND TINGLING OF RIGHT THUMB: ICD-10-CM

## 2021-09-01 DIAGNOSIS — R20.0 NUMBNESS AND TINGLING OF RIGHT THUMB: ICD-10-CM

## 2021-09-01 PROCEDURE — 97112 NEUROMUSCULAR REEDUCATION: CPT

## 2021-09-01 PROCEDURE — 97110 THERAPEUTIC EXERCISES: CPT

## 2021-09-01 NOTE — PROGRESS NOTES
Daily Note     Today's date: 2021  Patient name: Peace Sewell  : 1960  MRN: [id number]  Referring provider: Floretta Pallas, DO  Dx:   Encounter Diagnosis     ICD-10-CM    1  Injury of right shoulder, initial encounter  S49  91XA    2  Numbness of right thumb  R20 0    3  Numbness and tingling of right thumb  R20 0     R20 2                   Subjective: Pt reports tingling in R thumb is constant but states not pain or tingling in R UE if she is not using it  If she does use the arm, she reports "achy" pain grading it a 3-4/10  Pt reports she is suppose to return to work in 2 weeks  She also reports MD visit this Friday  Objective: See treatment diary below      Assessment: Pt demonstrates good tolerance to TE requiring occ cuing for correct technique  Pt demonstrates difficulty with supine CS retraction and extension  Patient demonstrated fatigue post treatment and would benefit from continued PT to increase flexibility, strength and function  Plan: Continue per plan of care        Precautions: none    Daily Treatment Diary    Date    Visit Number 6 7 8  5   FOTO     50/57   Re-Eval           Manuals    Retraction with OP  [address]       Cervical retraction,distraciton, extension  [address] w/ SP L median nerve glide  AF   HY, also side glide to L c median nerve glide R UE   STM  AF   PK   SOR & distraction        Neuro Re-Ed     xochitl row  13R 2x10  13R 2x10 13R 3x10  15 5R 2x10   xochitl ext  13R 2x10 13R 2x10 13R 3x10  12 5R 2x10   Radial nerve glides     10x w/ cues   No monies      10x5" GTB   Wall walks  GTB 7x GTB 7x GTB 7x     Body blade        Ball on wall    nv     Ther Ex    Cervical retraction in sitting  10x  10x 10x  10x   Retraction in sitting with OP  10x 10x 10x  10x   Retraction in supine 10x 10x 10x     Retraction extension supine  10x  10x 10x     UT stretch B  20"x5  20"x5 20" x 5     Retraction with ext in sitting  Unable  np       UBE BW  5' 5' 5'  nv Scapular slides      5" x 10   Thoracic ext with OP for upper thoracic      10x   Prone y/t    15x ea  10x   UT & Lev to R only     20" x 5   Ther Activity                                    Modalities    MHP PRN

## 2021-09-03 ENCOUNTER — OFFICE VISIT (OUTPATIENT)
Dept: FAMILY MEDICINE CLINIC | Facility: CLINIC | Age: 61
End: 2021-09-03
Payer: COMMERCIAL

## 2021-09-03 VITALS
RESPIRATION RATE: 18 BRPM | TEMPERATURE: 98.4 F | HEART RATE: 114 BPM | BODY MASS INDEX: 35.51 KG/M2 | SYSTOLIC BLOOD PRESSURE: 122 MMHG | HEIGHT: 62 IN | OXYGEN SATURATION: 95 % | DIASTOLIC BLOOD PRESSURE: 82 MMHG | WEIGHT: 193 LBS

## 2021-09-03 DIAGNOSIS — M54.12 CERVICAL RADICULOPATHY: Primary | ICD-10-CM

## 2021-09-03 PROCEDURE — 99213 OFFICE O/P EST LOW 20 MIN: CPT | Performed by: FAMILY MEDICINE

## 2021-09-03 PROCEDURE — 3008F BODY MASS INDEX DOCD: CPT | Performed by: FAMILY MEDICINE

## 2021-09-03 PROCEDURE — 1036F TOBACCO NON-USER: CPT | Performed by: FAMILY MEDICINE

## 2021-09-03 NOTE — LETTER
September 3, 2021     Patient: Jose Payton   YOB: 1960   Date of Visit: 9/3/2021       To Whom it May Concern:    Jose Payton is under my professional care  She was seen in my office on 9/3/2021  She may return to work on 9/20/2021  If you have any questions or concerns, please don't hesitate to call           Sincerely,          Konstantin Dallas MD        CC: No Recipients

## 2021-09-07 NOTE — PROGRESS NOTES
Assessment/Plan:    77-year-old woman with: Cervical radiculopathy encouraged continued therapy and supportive care and will do MRI and refer to pain management advised to call back if not improving worsening    No problem-specific Assessment & Plan notes found for this encounter  Diagnoses and all orders for this visit:    Cervical radiculopathy  -     MRI cervical spine wo contrast; Future  -     Cancel: Ambulatory referral to Pain Management; Future  -     Ambulatory referral to Pain Management; Future          Subjective:     Chief Complaint   Patient presents with    Follow-up     1 month follow up  Patient had labs done on 8/31 for review    Arm Pain     Patient has had intermittent right arm aching sensation  She has been going to PT with some relief  She still has 4 more weeks of PT    Immunizations     Patient declined flu vaccine today        Patient ID: Kristopher Castano is a 64 y o  female  Patient is a 77-year-old woman who presents for follow-up on cervical radiculopathy she has been doing physical therapy and only admits about 40% improvement she is still significant symptoms no fevers chills nausea vomiting no loss of bowel or bladder can not      The following portions of the patient's history were reviewed and updated as appropriate: allergies, current medications, past family history, past medical history, past social history, past surgical history and problem list     Review of Systems   Constitutional: Negative  HENT: Negative  Eyes: Negative  Respiratory: Negative  Cardiovascular: Negative  Gastrointestinal: Negative  Endocrine: Negative  Genitourinary: Negative  Musculoskeletal: Positive for neck pain  Allergic/Immunologic: Negative  Neurological: Negative  Hematological: Negative  Psychiatric/Behavioral: Negative  All other systems reviewed and are negative          Objective:      /82 (BP Location: Left arm, Patient Position: Sitting, Cuff Size: Adult)   Pulse (!) 114   Temp 98 4 °F (36 9 °C) (Tympanic)   Resp 18   Ht 5' 2" (1 575 m)   Wt 87 5 kg (193 lb)   SpO2 95%   BMI 35 30 kg/m²          Physical Exam  Constitutional:       Appearance: She is well-developed  HENT:      Head: Atraumatic  Right Ear: External ear normal       Left Ear: External ear normal    Eyes:      Conjunctiva/sclera: Conjunctivae normal       Pupils: Pupils are equal, round, and reactive to light  Pulmonary:      Effort: Pulmonary effort is normal  No respiratory distress  Abdominal:      General: There is no distension  Musculoskeletal:         General: Normal range of motion  Cervical back: Normal range of motion  Skin:     General: Skin is warm and dry  Neurological:      Mental Status: She is alert and oriented to person, place, and time  Cranial Nerves: No cranial nerve deficit  Psychiatric:         Behavior: Behavior normal          Thought Content: Thought content normal          Judgment: Judgment normal          BMI Counseling: Body mass index is 35 3 kg/m²  The BMI is above normal  Nutrition recommendations include encouraging healthy choices of fruits and vegetables  Exercise recommendations include moderate physical activity 150 minutes/week

## 2021-09-08 ENCOUNTER — TELEPHONE (OUTPATIENT)
Dept: FAMILY MEDICINE CLINIC | Facility: CLINIC | Age: 61
End: 2021-09-08

## 2022-06-07 ENCOUNTER — VBI (OUTPATIENT)
Dept: ADMINISTRATIVE | Facility: OTHER | Age: 62
End: 2022-06-07

## 2022-09-13 ENCOUNTER — VBI (OUTPATIENT)
Dept: ADMINISTRATIVE | Facility: OTHER | Age: 62
End: 2022-09-13

## 2022-10-04 ENCOUNTER — TELEPHONE (OUTPATIENT)
Dept: FAMILY MEDICINE CLINIC | Facility: CLINIC | Age: 62
End: 2022-10-04

## 2022-10-04 NOTE — TELEPHONE ENCOUNTER
Please call her to schedule her annual physical with Dr Love Trujillo  Her last physical was in August 2021    Thank you

## 2022-10-12 PROBLEM — Z00.00 ROUTINE ADULT HEALTH MAINTENANCE: Status: RESOLVED | Noted: 2021-08-06 | Resolved: 2022-10-12

## 2023-04-07 ENCOUNTER — TELEPHONE (OUTPATIENT)
Dept: FAMILY MEDICINE CLINIC | Facility: CLINIC | Age: 63
End: 2023-04-07

## 2023-06-01 ENCOUNTER — VBI (OUTPATIENT)
Dept: ADMINISTRATIVE | Facility: OTHER | Age: 63
End: 2023-06-01

## 2023-06-27 ENCOUNTER — VBI (OUTPATIENT)
Dept: ADMINISTRATIVE | Facility: OTHER | Age: 63
End: 2023-06-27

## 2023-08-10 ENCOUNTER — VBI (OUTPATIENT)
Dept: ADMINISTRATIVE | Facility: OTHER | Age: 63
End: 2023-08-10